# Patient Record
Sex: FEMALE | Race: BLACK OR AFRICAN AMERICAN | NOT HISPANIC OR LATINO | URBAN - METROPOLITAN AREA
[De-identification: names, ages, dates, MRNs, and addresses within clinical notes are randomized per-mention and may not be internally consistent; named-entity substitution may affect disease eponyms.]

---

## 2017-10-26 ENCOUNTER — EMERGENCY (EMERGENCY)
Facility: HOSPITAL | Age: 22
LOS: 1 days | Discharge: HOME | End: 2017-10-26
Attending: EMERGENCY MEDICINE | Admitting: EMERGENCY MEDICINE
Payer: MEDICAID

## 2017-10-26 VITALS
DIASTOLIC BLOOD PRESSURE: 73 MMHG | RESPIRATION RATE: 15 BRPM | HEART RATE: 105 BPM | SYSTOLIC BLOOD PRESSURE: 106 MMHG | OXYGEN SATURATION: 100 % | TEMPERATURE: 98 F

## 2017-10-26 LAB
BASOPHILS # BLD AUTO: 0 K/UL — SIGNIFICANT CHANGE UP (ref 0–0.2)
BASOPHILS NFR BLD AUTO: 0 % — SIGNIFICANT CHANGE UP (ref 0–2)
CHLORIDE SERPL-SCNC: 103 MMOL/L — SIGNIFICANT CHANGE UP (ref 98–107)
EOSINOPHIL # BLD AUTO: 0.02 K/UL — SIGNIFICANT CHANGE UP (ref 0–0.5)
EOSINOPHIL NFR BLD AUTO: 0.2 % — SIGNIFICANT CHANGE UP (ref 0–6)
HCG SERPL-ACNC: < 5 MIU/ML — SIGNIFICANT CHANGE UP
HCT VFR BLD CALC: 31 % — LOW (ref 34.5–45)
HGB BLD-MCNC: 9.5 G/DL — LOW (ref 11.5–15.5)
IMM GRANULOCYTES # BLD AUTO: 0.04 # — SIGNIFICANT CHANGE UP
IMM GRANULOCYTES NFR BLD AUTO: 0.4 % — SIGNIFICANT CHANGE UP (ref 0–1.5)
INR BLD: 1.09 — SIGNIFICANT CHANGE UP (ref 0.88–1.17)
LYMPHOCYTES # BLD AUTO: 1.01 K/UL — SIGNIFICANT CHANGE UP (ref 1–3.3)
LYMPHOCYTES # BLD AUTO: 10 % — LOW (ref 13–44)
MCHC RBC-ENTMCNC: 24.6 PG — LOW (ref 27–34)
MCHC RBC-ENTMCNC: 30.6 % — LOW (ref 32–36)
MCV RBC AUTO: 80.3 FL — SIGNIFICANT CHANGE UP (ref 80–100)
MONOCYTES # BLD AUTO: 0.41 K/UL — SIGNIFICANT CHANGE UP (ref 0–0.9)
MONOCYTES NFR BLD AUTO: 4.1 % — SIGNIFICANT CHANGE UP (ref 2–14)
NEUTROPHILS # BLD AUTO: 8.6 K/UL — HIGH (ref 1.8–7.4)
NEUTROPHILS NFR BLD AUTO: 85.3 % — HIGH (ref 43–77)
NRBC # FLD: 0 — SIGNIFICANT CHANGE UP
PLATELET # BLD AUTO: 610 K/UL — HIGH (ref 150–400)
PMV BLD: 10.8 FL — SIGNIFICANT CHANGE UP (ref 7–13)
POTASSIUM SERPL-MCNC: 4 MMOL/L — SIGNIFICANT CHANGE UP (ref 3.5–5.3)
POTASSIUM SERPL-SCNC: 4 MMOL/L — SIGNIFICANT CHANGE UP (ref 3.5–5.3)
PROTHROM AB SERPL-ACNC: 12.2 SEC — SIGNIFICANT CHANGE UP (ref 9.8–13.1)
RBC # BLD: 3.86 M/UL — SIGNIFICANT CHANGE UP (ref 3.8–5.2)
RBC # FLD: 15.5 % — HIGH (ref 10.3–14.5)
SODIUM SERPL-SCNC: 139 MMOL/L — SIGNIFICANT CHANGE UP (ref 135–145)
WBC # BLD: 10.08 K/UL — SIGNIFICANT CHANGE UP (ref 3.8–10.5)
WBC # FLD AUTO: 10.08 K/UL — SIGNIFICANT CHANGE UP (ref 3.8–10.5)

## 2017-10-26 PROCEDURE — 93010 ELECTROCARDIOGRAM REPORT: CPT

## 2017-10-26 PROCEDURE — 99285 EMERGENCY DEPT VISIT HI MDM: CPT | Mod: 25

## 2017-10-26 RX ORDER — IBUPROFEN 200 MG
400 TABLET ORAL ONCE
Qty: 0 | Refills: 0 | Status: COMPLETED | OUTPATIENT
Start: 2017-10-26 | End: 2017-10-26

## 2017-10-26 RX ORDER — ALBUTEROL 90 UG/1
2.5 AEROSOL, METERED ORAL ONCE
Qty: 0 | Refills: 0 | Status: COMPLETED | OUTPATIENT
Start: 2017-10-26 | End: 2017-10-26

## 2017-10-26 NOTE — ED ADULT NURSE NOTE - OBJECTIVE STATEMENT
A&Ox4, respirations even and unlabored, skin warm and dry good for color, ambulates with steady gait. Patient reports non-radiating midsternal chest pain and SOB since Saturday. Went to [facility] and was discharged today, was told had a lupus flare up. States came to [facility] ED due to no improvement of pain. Hx lupus.

## 2017-10-26 NOTE — ED ADULT TRIAGE NOTE - CHIEF COMPLAINT QUOTE
pt c/o sharp midsternal chest pain since saturday. pain accompanied by sob but does not radiate and denies n/v, diaphoresis or any numbness, tingling in extremities. hx lupus. pt appears comfortable

## 2017-10-27 VITALS
DIASTOLIC BLOOD PRESSURE: 56 MMHG | HEART RATE: 111 BPM | TEMPERATURE: 99 F | SYSTOLIC BLOOD PRESSURE: 104 MMHG | OXYGEN SATURATION: 98 %

## 2017-10-27 LAB
ALBUMIN SERPL ELPH-MCNC: 4 G/DL — SIGNIFICANT CHANGE UP (ref 3.3–5)
ALP SERPL-CCNC: 44 U/L — SIGNIFICANT CHANGE UP (ref 40–120)
ALT FLD-CCNC: 6 U/L — SIGNIFICANT CHANGE UP (ref 4–33)
APTT BLD: 22.5 SEC — LOW (ref 27.5–37.4)
AST SERPL-CCNC: 15 U/L — SIGNIFICANT CHANGE UP (ref 4–32)
BILIRUB SERPL-MCNC: 0.2 MG/DL — SIGNIFICANT CHANGE UP (ref 0.2–1.2)
BUN SERPL-MCNC: 12 MG/DL — SIGNIFICANT CHANGE UP (ref 7–23)
CALCIUM SERPL-MCNC: 8.7 MG/DL — SIGNIFICANT CHANGE UP (ref 8.4–10.5)
CK MB BLD-MCNC: 1 NG/ML — SIGNIFICANT CHANGE UP (ref 1–4.7)
CK MB BLD-MCNC: SIGNIFICANT CHANGE UP (ref 0–2.5)
CK SERPL-CCNC: 32 U/L — SIGNIFICANT CHANGE UP (ref 25–170)
CO2 SERPL-SCNC: 21 MMOL/L — LOW (ref 22–31)
CREAT SERPL-MCNC: 0.66 MG/DL — SIGNIFICANT CHANGE UP (ref 0.5–1.3)
GLUCOSE SERPL-MCNC: 120 MG/DL — HIGH (ref 70–99)
NT-PROBNP SERPL-SCNC: 100.6 PG/ML — SIGNIFICANT CHANGE UP
PROT SERPL-MCNC: 8.7 G/DL — HIGH (ref 6–8.3)
TROPONIN T SERPL-MCNC: < 0.06 NG/ML — SIGNIFICANT CHANGE UP (ref 0–0.06)

## 2017-10-27 PROCEDURE — 71020: CPT | Mod: 26

## 2017-10-27 RX ADMIN — Medication 24 MILLIGRAM(S): at 01:13

## 2017-10-27 RX ADMIN — ALBUTEROL 2.5 MILLIGRAM(S): 90 AEROSOL, METERED ORAL at 00:53

## 2017-10-27 RX ADMIN — Medication 400 MILLIGRAM(S): at 00:53

## 2017-10-27 NOTE — ED PROVIDER NOTE - MEDICAL DECISION MAKING DETAILS
21F SLE p/w chest pain, dyspnea. Negative CTA for PE 3d ago. Possible recurrence of pericardial effusion. Symptomatic tx with albuterol, ibuprofen. Labs, CXR. EKG abnormal with multiple TWI.

## 2017-10-27 NOTE — ED PROVIDER NOTE - PROGRESS NOTE DETAILS
Bilateral pleural effusions on CXR. Pt ambulating without dyspnea. C/o palpitations after albuterol. Appears well. Called answering service for Dr. Willett at [facility], did not respond to multiple phone calls. Will discharge with recommendation to continue steroids.

## 2017-10-27 NOTE — ED PROVIDER NOTE - OBJECTIVE STATEMENT
20yo female pmh SLE on cellcept/plaquenil p/w chest pain, dyspnea worse with exertion x 5d. CP sharp, pleuritic in nature, non-radiating, substernal. No dyspnea at rest. Denies f/c/n/v/d, abd pain, LOC, vision changes. No history of DVT/PE. Hospitalized 10/23-26, given methylprednisolone IV and medrol x 2w, pt did not take medrol today as she did not fill Rx. CTA showed no PE, but revealed complex mild-moderate pericardial effusion that resolved next day on TTE (10/24). Follows with [facility] rheumatology.

## 2017-10-27 NOTE — ED PROVIDER NOTE - ATTENDING CONTRIBUTION TO CARE
I performed a face-to-face evaluation of the patient and performed a history and physical examination. I agree with the history and physical examination.    21 F, lupus, CP and SOB 4 days, worse with movement, discharged today from [facility], CT PE neg, echo no pericardial effusion. Unknown lupus anticoagulant Ab status.  Appears well. NAD. . No LE edema. Walked briskly without problem. Occasional small expiratory wheeze. EKG w/ diffuse TWI. Will give nebs, check trop and BNP. Contact her Rheum.

## 2017-10-27 NOTE — ED PROVIDER NOTE - CARE PLAN
Principal Discharge DX:	Chest pain  Secondary Diagnosis:	Lupus erythematosus, unspecified form Principal Discharge DX:	Pleural effusion  Secondary Diagnosis:	Lupus erythematosus, unspecified form

## 2018-10-22 ENCOUNTER — INPATIENT (INPATIENT)
Facility: HOSPITAL | Age: 23
LOS: 1 days | Discharge: HOME | End: 2018-10-24
Attending: HOSPITALIST | Admitting: HOSPITALIST
Payer: COMMERCIAL

## 2018-10-22 VITALS
SYSTOLIC BLOOD PRESSURE: 98 MMHG | RESPIRATION RATE: 20 BRPM | DIASTOLIC BLOOD PRESSURE: 62 MMHG | TEMPERATURE: 100 F | HEART RATE: 112 BPM | OXYGEN SATURATION: 98 %

## 2018-10-22 DIAGNOSIS — L93.0 DISCOID LUPUS ERYTHEMATOSUS: ICD-10-CM

## 2018-10-22 LAB
ALBUMIN SERPL ELPH-MCNC: 3.2 G/DL — LOW (ref 3.3–5)
ALP SERPL-CCNC: 40 U/L — SIGNIFICANT CHANGE UP (ref 40–120)
ALT FLD-CCNC: 4 U/L — SIGNIFICANT CHANGE UP (ref 4–33)
APPEARANCE UR: SIGNIFICANT CHANGE UP
AST SERPL-CCNC: 17 U/L — SIGNIFICANT CHANGE UP (ref 4–32)
B PERT DNA SPEC QL NAA+PROBE: SIGNIFICANT CHANGE UP
BACTERIA # UR AUTO: SIGNIFICANT CHANGE UP
BASE EXCESS BLDV CALC-SCNC: 1.5 MMOL/L — SIGNIFICANT CHANGE UP
BASOPHILS # BLD AUTO: 0.01 K/UL — SIGNIFICANT CHANGE UP (ref 0–0.2)
BASOPHILS NFR BLD AUTO: 0.1 % — SIGNIFICANT CHANGE UP (ref 0–2)
BILIRUB SERPL-MCNC: < 0.2 MG/DL — LOW (ref 0.2–1.2)
BILIRUB UR-MCNC: NEGATIVE — SIGNIFICANT CHANGE UP
BLOOD GAS VENOUS - CREATININE: SIGNIFICANT CHANGE UP MG/DL (ref 0.5–1.3)
BLOOD UR QL VISUAL: NEGATIVE — SIGNIFICANT CHANGE UP
BUN SERPL-MCNC: 15 MG/DL — SIGNIFICANT CHANGE UP (ref 7–23)
C PNEUM DNA SPEC QL NAA+PROBE: NOT DETECTED — SIGNIFICANT CHANGE UP
CALCIUM SERPL-MCNC: 8.4 MG/DL — SIGNIFICANT CHANGE UP (ref 8.4–10.5)
CHLORIDE BLDV-SCNC: 104 MMOL/L — SIGNIFICANT CHANGE UP (ref 96–108)
CHLORIDE SERPL-SCNC: 100 MMOL/L — SIGNIFICANT CHANGE UP (ref 98–107)
CO2 SERPL-SCNC: 24 MMOL/L — SIGNIFICANT CHANGE UP (ref 22–31)
COLOR SPEC: YELLOW — SIGNIFICANT CHANGE UP
CREAT SERPL-MCNC: 0.72 MG/DL — SIGNIFICANT CHANGE UP (ref 0.5–1.3)
CRP SERPL-MCNC: 51.7 MG/L — HIGH
EOSINOPHIL # BLD AUTO: 0 K/UL — SIGNIFICANT CHANGE UP (ref 0–0.5)
EOSINOPHIL NFR BLD AUTO: 0 % — SIGNIFICANT CHANGE UP (ref 0–6)
ERYTHROCYTE [SEDIMENTATION RATE] IN BLOOD: 33 MM/HR — HIGH (ref 4–25)
FLUAV H1 2009 PAND RNA SPEC QL NAA+PROBE: NOT DETECTED — SIGNIFICANT CHANGE UP
FLUAV H1 RNA SPEC QL NAA+PROBE: NOT DETECTED — SIGNIFICANT CHANGE UP
FLUAV H3 RNA SPEC QL NAA+PROBE: NOT DETECTED — SIGNIFICANT CHANGE UP
FLUAV SUBTYP SPEC NAA+PROBE: SIGNIFICANT CHANGE UP
FLUBV RNA SPEC QL NAA+PROBE: NOT DETECTED — SIGNIFICANT CHANGE UP
GAS PNL BLDV: 135 MMOL/L — LOW (ref 136–146)
GLUCOSE BLDV-MCNC: 108 — HIGH (ref 70–99)
GLUCOSE SERPL-MCNC: 106 MG/DL — HIGH (ref 70–99)
GLUCOSE UR-MCNC: NEGATIVE — SIGNIFICANT CHANGE UP
HADV DNA SPEC QL NAA+PROBE: NOT DETECTED — SIGNIFICANT CHANGE UP
HCO3 BLDV-SCNC: 25 MMOL/L — SIGNIFICANT CHANGE UP (ref 20–27)
HCOV 229E RNA SPEC QL NAA+PROBE: NOT DETECTED — SIGNIFICANT CHANGE UP
HCOV HKU1 RNA SPEC QL NAA+PROBE: NOT DETECTED — SIGNIFICANT CHANGE UP
HCOV NL63 RNA SPEC QL NAA+PROBE: NOT DETECTED — SIGNIFICANT CHANGE UP
HCOV OC43 RNA SPEC QL NAA+PROBE: NOT DETECTED — SIGNIFICANT CHANGE UP
HCT VFR BLD CALC: 30.1 % — LOW (ref 34.5–45)
HCT VFR BLDV CALC: 28.2 % — LOW (ref 34.5–45)
HGB BLD-MCNC: 9.1 G/DL — LOW (ref 11.5–15.5)
HGB BLDV-MCNC: 9.1 G/DL — LOW (ref 11.5–15.5)
HMPV RNA SPEC QL NAA+PROBE: NOT DETECTED — SIGNIFICANT CHANGE UP
HPIV1 RNA SPEC QL NAA+PROBE: NOT DETECTED — SIGNIFICANT CHANGE UP
HPIV2 RNA SPEC QL NAA+PROBE: NOT DETECTED — SIGNIFICANT CHANGE UP
HPIV3 RNA SPEC QL NAA+PROBE: NOT DETECTED — SIGNIFICANT CHANGE UP
HPIV4 RNA SPEC QL NAA+PROBE: NOT DETECTED — SIGNIFICANT CHANGE UP
HYALINE CASTS # UR AUTO: HIGH
IMM GRANULOCYTES # BLD AUTO: 0.05 # — SIGNIFICANT CHANGE UP
IMM GRANULOCYTES NFR BLD AUTO: 0.7 % — SIGNIFICANT CHANGE UP (ref 0–1.5)
KETONES UR-MCNC: NEGATIVE — SIGNIFICANT CHANGE UP
LACTATE BLDV-MCNC: 2.4 MMOL/L — HIGH (ref 0.5–2)
LEUKOCYTE ESTERASE UR-ACNC: NEGATIVE — SIGNIFICANT CHANGE UP
LYMPHOCYTES # BLD AUTO: 0.29 K/UL — LOW (ref 1–3.3)
LYMPHOCYTES # BLD AUTO: 4.1 % — LOW (ref 13–44)
M PNEUMO DNA SPEC QL NAA+PROBE: NOT DETECTED — SIGNIFICANT CHANGE UP
MCHC RBC-ENTMCNC: 22.6 PG — LOW (ref 27–34)
MCHC RBC-ENTMCNC: 30.2 % — LOW (ref 32–36)
MCV RBC AUTO: 74.7 FL — LOW (ref 80–100)
MONOCYTES # BLD AUTO: 0.19 K/UL — SIGNIFICANT CHANGE UP (ref 0–0.9)
MONOCYTES NFR BLD AUTO: 2.7 % — SIGNIFICANT CHANGE UP (ref 2–14)
NEUTROPHILS # BLD AUTO: 6.54 K/UL — SIGNIFICANT CHANGE UP (ref 1.8–7.4)
NEUTROPHILS NFR BLD AUTO: 92.4 % — HIGH (ref 43–77)
NITRITE UR-MCNC: NEGATIVE — SIGNIFICANT CHANGE UP
NRBC # FLD: 0 — SIGNIFICANT CHANGE UP
PCO2 BLDV: 41 MMHG — SIGNIFICANT CHANGE UP (ref 41–51)
PH BLDV: 7.41 PH — SIGNIFICANT CHANGE UP (ref 7.32–7.43)
PH UR: 6.5 — SIGNIFICANT CHANGE UP (ref 5–8)
PLATELET # BLD AUTO: 551 K/UL — HIGH (ref 150–400)
PMV BLD: 10.4 FL — SIGNIFICANT CHANGE UP (ref 7–13)
PO2 BLDV: 36 MMHG — SIGNIFICANT CHANGE UP (ref 35–40)
POTASSIUM BLDV-SCNC: 3.7 MMOL/L — SIGNIFICANT CHANGE UP (ref 3.4–4.5)
POTASSIUM SERPL-MCNC: 3.8 MMOL/L — SIGNIFICANT CHANGE UP (ref 3.5–5.3)
POTASSIUM SERPL-SCNC: 3.8 MMOL/L — SIGNIFICANT CHANGE UP (ref 3.5–5.3)
PROT SERPL-MCNC: 7.1 G/DL — SIGNIFICANT CHANGE UP (ref 6–8.3)
PROT UR-MCNC: 100 — HIGH
RBC # BLD: 4.03 M/UL — SIGNIFICANT CHANGE UP (ref 3.8–5.2)
RBC # FLD: 17.2 % — HIGH (ref 10.3–14.5)
RBC CASTS # UR COMP ASSIST: SIGNIFICANT CHANGE UP (ref 0–?)
RSV RNA SPEC QL NAA+PROBE: NOT DETECTED — SIGNIFICANT CHANGE UP
RV+EV RNA SPEC QL NAA+PROBE: NOT DETECTED — SIGNIFICANT CHANGE UP
SAO2 % BLDV: 67.1 % — SIGNIFICANT CHANGE UP (ref 60–85)
SODIUM SERPL-SCNC: 136 MMOL/L — SIGNIFICANT CHANGE UP (ref 135–145)
SP GR SPEC: 1.03 — SIGNIFICANT CHANGE UP (ref 1–1.04)
SQUAMOUS # UR AUTO: SIGNIFICANT CHANGE UP
UROBILINOGEN FLD QL: SIGNIFICANT CHANGE UP
WBC # BLD: 7.08 K/UL — SIGNIFICANT CHANGE UP (ref 3.8–10.5)
WBC # FLD AUTO: 7.08 K/UL — SIGNIFICANT CHANGE UP (ref 3.8–10.5)
WBC UR QL: HIGH (ref 0–?)

## 2018-10-22 PROCEDURE — 71046 X-RAY EXAM CHEST 2 VIEWS: CPT | Mod: 26

## 2018-10-22 PROCEDURE — 74177 CT ABD & PELVIS W/CONTRAST: CPT | Mod: 26

## 2018-10-22 RX ORDER — SODIUM CHLORIDE 9 MG/ML
1000 INJECTION INTRAMUSCULAR; INTRAVENOUS; SUBCUTANEOUS ONCE
Qty: 0 | Refills: 0 | Status: COMPLETED | OUTPATIENT
Start: 2018-10-22 | End: 2018-10-22

## 2018-10-22 RX ORDER — KETOROLAC TROMETHAMINE 30 MG/ML
15 SYRINGE (ML) INJECTION ONCE
Qty: 0 | Refills: 0 | Status: DISCONTINUED | OUTPATIENT
Start: 2018-10-22 | End: 2018-10-22

## 2018-10-22 RX ORDER — DIPHENHYDRAMINE HCL 50 MG
25 CAPSULE ORAL ONCE
Qty: 0 | Refills: 0 | Status: COMPLETED | OUTPATIENT
Start: 2018-10-22 | End: 2018-10-22

## 2018-10-22 RX ORDER — FENTANYL CITRATE 50 UG/ML
25 INJECTION INTRAVENOUS ONCE
Qty: 0 | Refills: 0 | Status: DISCONTINUED | OUTPATIENT
Start: 2018-10-22 | End: 2018-10-22

## 2018-10-22 RX ORDER — DIPHENHYDRAMINE HCL 50 MG
50 CAPSULE ORAL ONCE
Qty: 0 | Refills: 0 | Status: DISCONTINUED | OUTPATIENT
Start: 2018-10-22 | End: 2018-10-22

## 2018-10-22 RX ORDER — CEFTRIAXONE 500 MG/1
1 INJECTION, POWDER, FOR SOLUTION INTRAMUSCULAR; INTRAVENOUS ONCE
Qty: 0 | Refills: 0 | Status: COMPLETED | OUTPATIENT
Start: 2018-10-22 | End: 2018-10-22

## 2018-10-22 RX ORDER — ACETAMINOPHEN 500 MG
975 TABLET ORAL ONCE
Qty: 0 | Refills: 0 | Status: COMPLETED | OUTPATIENT
Start: 2018-10-22 | End: 2018-10-22

## 2018-10-22 RX ORDER — MORPHINE SULFATE 50 MG/1
4 CAPSULE, EXTENDED RELEASE ORAL ONCE
Qty: 0 | Refills: 0 | Status: DISCONTINUED | OUTPATIENT
Start: 2018-10-22 | End: 2018-10-23

## 2018-10-22 RX ADMIN — Medication 975 MILLIGRAM(S): at 19:59

## 2018-10-22 RX ADMIN — SODIUM CHLORIDE 1000 MILLILITER(S): 9 INJECTION INTRAMUSCULAR; INTRAVENOUS; SUBCUTANEOUS at 19:03

## 2018-10-22 RX ADMIN — Medication 15 MILLIGRAM(S): at 19:59

## 2018-10-22 RX ADMIN — FENTANYL CITRATE 25 MICROGRAM(S): 50 INJECTION INTRAVENOUS at 19:59

## 2018-10-22 RX ADMIN — Medication 25 MILLIGRAM(S): at 22:58

## 2018-10-22 RX ADMIN — SODIUM CHLORIDE 1000 MILLILITER(S): 9 INJECTION INTRAMUSCULAR; INTRAVENOUS; SUBCUTANEOUS at 19:59

## 2018-10-22 RX ADMIN — Medication 975 MILLIGRAM(S): at 19:03

## 2018-10-22 NOTE — ED PROVIDER NOTE - PROGRESS NOTE DETAILS
JULIANN Castellanos: Pt received as signout, pending CT abd/pelvis for diffuse intermittent abd pain x 1wk, diffuse tenderness. Pt still w/10/10 pain despite tylenol. will administer more analgesia pending hcg result. RN informed. Keaton Kenney is complaining of throat itching after eating some chocolate chipped cookies. She denies any known allergies, difficulty breathing, cp, rashes, swelling. Ordered 25 of benadryl

## 2018-10-22 NOTE — ED PROVIDER NOTE - MEDICAL DECISION MAKING DETAILS
23 y/o F hx lupus on biologic injections monthly here w/ fever x 1week w/ anorexia. Exam non focal besides for mild LLQ abdominal tenderness, however no guarding. Plan labs/blood cultures, pain medications, cxr/ua/rvp to look for source. DDX infectious vs. lupus flair 23 y/o F hx lupus on biologic injections monthly here w/ fever x 1week w/ anorexia. Exam non focal besides for mild LLQ abdominal tenderness, however no guarding will do ct abdomen. Plan labs/blood cultures, pain medications, cxr/ua/rvp to look for source. DDX infectious vs. lupus flair

## 2018-10-22 NOTE — ED PROVIDER NOTE - OBJECTIVE STATEMENT
23 y/o F hx lupus on Prednisone and biologic infusions monthly here c/o joint pain x 1 week. Admits to a tactile fever, anorexia and nausea over the past week as well. States joint pain is consistent w/ her typical lupus flairs. Did not come to the ER because she thought she would improve w/ Tylenol at home. Denies abdominal pain, dysuria, flank pain, cough, sob, rash or any other complaints. Of note, gets pleural effusions due to lupus. LMP 1 week ago. No sick contacts

## 2018-10-22 NOTE — ED PROVIDER NOTE - ATTENDING CONTRIBUTION TO CARE
I performed the initial face to face bedside interview with this patient regarding history of present illness, review of symptoms and past medical, social and family history.  I completed an independent physical examination.  I was the initial provider who evaluated this patient.  The history, review of symptoms and examination was documented by the scribe in my presence and I attest to the accuracy of the documentation.  I have signed out the follow up of any pending tests (i.e. labs, radiological studies) to the PA.  I have discussed the patient’s plan of care and disposition with the PA. I performed the initial face to face bedside interview with this patient regarding history of present illness, review of symptoms and past medical, social and family history.  I completed an independent physical examination.  I was the initial provider who evaluated this patient.  The history, review of symptoms and examination was documented by the scribe in my presence and I attest to the accuracy of the documentation.  I have signed out the follow up of any pending tests (i.e. labs, radiological studies) to the PA.  I have discussed the patient’s plan of care and disposition with the PA.    22F h/o SLE on immunotherapy presents with 1 week of fevers and joint pains.    Exam:  - nad  - rrr   -ctab  - abd soft, diffusely uncomfortable to palpation    A/P  - likely SLE flare, r/o intra-abd pathology  - basic labs, ua, ucg, CT abd/pelvis  - pain control, reassess I performed the initial face to face bedside interview with this patient regarding history of present illness, review of symptoms and past medical, social and family history.  I completed an independent physical examination.  I was the initial provider who evaluated this patient.  The history, review of symptoms and examination was documented by the scribe in my presence and I attest to the accuracy of the documentation.  I have signed out the follow up of any pending tests (i.e. labs, radiological studies) to the PA and resident..  I have discussed the patient’s plan of care and disposition with the PA and resident.    22F h/o SLE on immunotherapy presents with 1 week of fevers and joint pains.    Exam:  - nad  - rrr   -ctab  - abd soft, diffusely uncomfortable to palpation    A/P  - likely SLE flare, r/o intra-abd pathology  - basic labs, ua, ucg, CT abd/pelvis  - pain control, reassess

## 2018-10-22 NOTE — ED ADULT NURSE NOTE - OBJECTIVE STATEMENT
Received pt. in room 8 alert and oriented x 4, presenting to the ER complaining of bilateral leg pain, back pain and pain in her joints. Pt. has a history of Lupus and stated " I have been having joint pain, back pain and feeling tired with fever since last week". Patient has no c/o chest pain no shortness of breath, labs sent, medicate as ordered, will continue to monitor.

## 2018-10-22 NOTE — ED ADULT NURSE NOTE - NSIMPLEMENTINTERV_GEN_ALL_ED
Implemented All Universal Safety Interventions:  [place] to call system. Call bell, personal items and telephone within reach. Instruct patient to call for assistance. Room bathroom lighting operational. Non-slip footwear when patient is off stretcher. Physically safe environment: no spills, clutter or unnecessary equipment. Stretcher in lowest position, wheels locked, appropriate side rails in place.

## 2018-10-23 DIAGNOSIS — L93.0 DISCOID LUPUS ERYTHEMATOSUS: ICD-10-CM

## 2018-10-23 DIAGNOSIS — D50.9 IRON DEFICIENCY ANEMIA, UNSPECIFIED: ICD-10-CM

## 2018-10-23 DIAGNOSIS — R80.9 PROTEINURIA, UNSPECIFIED: ICD-10-CM

## 2018-10-23 DIAGNOSIS — Z29.9 ENCOUNTER FOR PROPHYLACTIC MEASURES, UNSPECIFIED: ICD-10-CM

## 2018-10-23 LAB
ALBUMIN SERPL ELPH-MCNC: 2.5 G/DL — LOW (ref 3.3–5)
ALP SERPL-CCNC: 39 U/L — LOW (ref 40–120)
ALT FLD-CCNC: 4 U/L — SIGNIFICANT CHANGE UP (ref 4–33)
ANISOCYTOSIS BLD QL: SIGNIFICANT CHANGE UP
AST SERPL-CCNC: 13 U/L — SIGNIFICANT CHANGE UP (ref 4–32)
BASOPHILS # BLD AUTO: 0 K/UL — SIGNIFICANT CHANGE UP (ref 0–0.2)
BASOPHILS NFR BLD AUTO: 0 % — SIGNIFICANT CHANGE UP (ref 0–2)
BASOPHILS NFR SPEC: 1.8 % — SIGNIFICANT CHANGE UP (ref 0–2)
BILIRUB SERPL-MCNC: < 0.2 MG/DL — LOW (ref 0.2–1.2)
BLASTS # FLD: 0 % — SIGNIFICANT CHANGE UP (ref 0–0)
BUN SERPL-MCNC: 8 MG/DL — SIGNIFICANT CHANGE UP (ref 7–23)
C3 SERPL-MCNC: 43.1 MG/DL — LOW (ref 90–180)
C4 SERPL-MCNC: 9.1 MG/DL — LOW (ref 10–40)
CALCIUM SERPL-MCNC: 7.9 MG/DL — LOW (ref 8.4–10.5)
CHLORIDE SERPL-SCNC: 106 MMOL/L — SIGNIFICANT CHANGE UP (ref 98–107)
CO2 SERPL-SCNC: 23 MMOL/L — SIGNIFICANT CHANGE UP (ref 22–31)
CREAT SERPL-MCNC: 0.58 MG/DL — SIGNIFICANT CHANGE UP (ref 0.5–1.3)
DACRYOCYTES BLD QL SMEAR: SLIGHT — SIGNIFICANT CHANGE UP
EOSINOPHIL # BLD AUTO: 0.01 K/UL — SIGNIFICANT CHANGE UP (ref 0–0.5)
EOSINOPHIL NFR BLD AUTO: 0.3 % — SIGNIFICANT CHANGE UP (ref 0–6)
EOSINOPHIL NFR FLD: 0 % — SIGNIFICANT CHANGE UP (ref 0–6)
FERRITIN SERPL-MCNC: 95.69 NG/ML — SIGNIFICANT CHANGE UP (ref 15–150)
GIANT PLATELETS BLD QL SMEAR: PRESENT — SIGNIFICANT CHANGE UP
GLUCOSE SERPL-MCNC: 83 MG/DL — SIGNIFICANT CHANGE UP (ref 70–99)
HCT VFR BLD CALC: 31.2 % — LOW (ref 34.5–45)
HGB BLD-MCNC: 9.2 G/DL — LOW (ref 11.5–15.5)
HYPOCHROMIA BLD QL: SIGNIFICANT CHANGE UP
IMM GRANULOCYTES # BLD AUTO: 0.02 # — SIGNIFICANT CHANGE UP
IMM GRANULOCYTES NFR BLD AUTO: 0.7 % — SIGNIFICANT CHANGE UP (ref 0–1.5)
IRON SATN MFR SERPL: 16 UG/DL — LOW (ref 30–160)
IRON SATN MFR SERPL: 197 UG/DL — SIGNIFICANT CHANGE UP (ref 140–530)
LYMPHOCYTES # BLD AUTO: 0.64 K/UL — LOW (ref 1–3.3)
LYMPHOCYTES # BLD AUTO: 20.9 % — SIGNIFICANT CHANGE UP (ref 13–44)
LYMPHOCYTES NFR SPEC AUTO: 15.5 % — SIGNIFICANT CHANGE UP (ref 13–44)
MCHC RBC-ENTMCNC: 22.2 PG — LOW (ref 27–34)
MCHC RBC-ENTMCNC: 29.5 % — LOW (ref 32–36)
MCV RBC AUTO: 75.2 FL — LOW (ref 80–100)
METAMYELOCYTES # FLD: 0 % — SIGNIFICANT CHANGE UP (ref 0–1)
MICROCYTES BLD QL: SIGNIFICANT CHANGE UP
MONOCYTES # BLD AUTO: 0.19 K/UL — SIGNIFICANT CHANGE UP (ref 0–0.9)
MONOCYTES NFR BLD AUTO: 6.2 % — SIGNIFICANT CHANGE UP (ref 2–14)
MONOCYTES NFR BLD: 1.8 % — LOW (ref 2–9)
MYELOCYTES NFR BLD: 0 % — SIGNIFICANT CHANGE UP (ref 0–0)
NEUTROPHIL AB SER-ACNC: 77.3 % — HIGH (ref 43–77)
NEUTROPHILS # BLD AUTO: 2.2 K/UL — SIGNIFICANT CHANGE UP (ref 1.8–7.4)
NEUTROPHILS NFR BLD AUTO: 71.9 % — SIGNIFICANT CHANGE UP (ref 43–77)
NEUTS BAND # BLD: 0 % — SIGNIFICANT CHANGE UP (ref 0–6)
NRBC # FLD: 0 — SIGNIFICANT CHANGE UP
OTHER - HEMATOLOGY %: 0 — SIGNIFICANT CHANGE UP
OVALOCYTES BLD QL SMEAR: SLIGHT — SIGNIFICANT CHANGE UP
PLATELET # BLD AUTO: 470 K/UL — HIGH (ref 150–400)
PLATELET COUNT - ESTIMATE: NORMAL — SIGNIFICANT CHANGE UP
PMV BLD: 9.9 FL — SIGNIFICANT CHANGE UP (ref 7–13)
POIKILOCYTOSIS BLD QL AUTO: SIGNIFICANT CHANGE UP
POLYCHROMASIA BLD QL SMEAR: SLIGHT — SIGNIFICANT CHANGE UP
POTASSIUM SERPL-MCNC: 3.9 MMOL/L — SIGNIFICANT CHANGE UP (ref 3.5–5.3)
POTASSIUM SERPL-SCNC: 3.9 MMOL/L — SIGNIFICANT CHANGE UP (ref 3.5–5.3)
PROMYELOCYTES # FLD: 0 % — SIGNIFICANT CHANGE UP (ref 0–0)
PROT SERPL-MCNC: 6.1 G/DL — SIGNIFICANT CHANGE UP (ref 6–8.3)
RBC # BLD: 4.15 M/UL — SIGNIFICANT CHANGE UP (ref 3.8–5.2)
RBC # FLD: 17.7 % — HIGH (ref 10.3–14.5)
REVIEW TO FOLLOW: YES — SIGNIFICANT CHANGE UP
SCHISTOCYTES BLD QL AUTO: SLIGHT — SIGNIFICANT CHANGE UP
SMUDGE CELLS # BLD: PRESENT — SIGNIFICANT CHANGE UP
SODIUM SERPL-SCNC: 138 MMOL/L — SIGNIFICANT CHANGE UP (ref 135–145)
SPECIMEN SOURCE: SIGNIFICANT CHANGE UP
SPECIMEN SOURCE: SIGNIFICANT CHANGE UP
TRANSFERRIN SERPL-MCNC: 153 MG/DL — LOW (ref 200–360)
UIBC SERPL-MCNC: 180.9 UG/DL — SIGNIFICANT CHANGE UP (ref 110–370)
VARIANT LYMPHS # BLD: 3.6 % — SIGNIFICANT CHANGE UP
WBC # BLD: 3.06 K/UL — LOW (ref 3.8–10.5)
WBC # FLD AUTO: 3.06 K/UL — LOW (ref 3.8–10.5)

## 2018-10-23 PROCEDURE — 99255 IP/OBS CONSLTJ NEW/EST HI 80: CPT

## 2018-10-23 PROCEDURE — 99223 1ST HOSP IP/OBS HIGH 75: CPT | Mod: GC

## 2018-10-23 PROCEDURE — 12345: CPT | Mod: NC,GC

## 2018-10-23 RX ORDER — PANTOPRAZOLE SODIUM 20 MG/1
1 TABLET, DELAYED RELEASE ORAL
Qty: 0 | Refills: 0 | COMMUNITY
Start: 2018-10-23

## 2018-10-23 RX ORDER — ENOXAPARIN SODIUM 100 MG/ML
40 INJECTION SUBCUTANEOUS EVERY 24 HOURS
Qty: 0 | Refills: 0 | Status: DISCONTINUED | OUTPATIENT
Start: 2018-10-23 | End: 2018-10-23

## 2018-10-23 RX ORDER — PANTOPRAZOLE SODIUM 20 MG/1
40 TABLET, DELAYED RELEASE ORAL
Qty: 0 | Refills: 0 | Status: DISCONTINUED | OUTPATIENT
Start: 2018-10-23 | End: 2018-10-24

## 2018-10-23 RX ORDER — HYDROXYCHLOROQUINE SULFATE 200 MG
1 TABLET ORAL
Qty: 0 | Refills: 0 | COMMUNITY
Start: 2018-10-23

## 2018-10-23 RX ORDER — KETOROLAC TROMETHAMINE 30 MG/ML
10 SYRINGE (ML) INJECTION EVERY 4 HOURS
Qty: 0 | Refills: 0 | Status: DISCONTINUED | OUTPATIENT
Start: 2018-10-23 | End: 2018-10-24

## 2018-10-23 RX ORDER — INFLUENZA VIRUS VACCINE 15; 15; 15; 15 UG/.5ML; UG/.5ML; UG/.5ML; UG/.5ML
0.5 SUSPENSION INTRAMUSCULAR ONCE
Qty: 0 | Refills: 0 | Status: DISCONTINUED | OUTPATIENT
Start: 2018-10-23 | End: 2018-10-24

## 2018-10-23 RX ORDER — HYDROXYCHLOROQUINE SULFATE 200 MG
200 TABLET ORAL DAILY
Qty: 0 | Refills: 0 | Status: DISCONTINUED | OUTPATIENT
Start: 2018-10-24 | End: 2018-10-24

## 2018-10-23 RX ORDER — HYDROXYCHLOROQUINE SULFATE 200 MG
TABLET ORAL
Qty: 0 | Refills: 0 | Status: DISCONTINUED | OUTPATIENT
Start: 2018-10-23 | End: 2018-10-24

## 2018-10-23 RX ORDER — HYDROXYCHLOROQUINE SULFATE 200 MG
200 TABLET ORAL ONCE
Qty: 0 | Refills: 0 | Status: COMPLETED | OUTPATIENT
Start: 2018-10-23 | End: 2018-10-23

## 2018-10-23 RX ORDER — BELIMUMAB 200 MG/ML
0 SOLUTION SUBCUTANEOUS
Qty: 0 | Refills: 0 | COMMUNITY

## 2018-10-23 RX ADMIN — Medication 500 MILLIGRAM(S): at 14:32

## 2018-10-23 RX ADMIN — Medication 500 MILLIGRAM(S): at 21:43

## 2018-10-23 RX ADMIN — Medication 200 MILLIGRAM(S): at 14:32

## 2018-10-23 RX ADMIN — Medication 30 MILLIGRAM(S): at 17:21

## 2018-10-23 RX ADMIN — ENOXAPARIN SODIUM 40 MILLIGRAM(S): 100 INJECTION SUBCUTANEOUS at 07:02

## 2018-10-23 RX ADMIN — CEFTRIAXONE 100 GRAM(S): 500 INJECTION, POWDER, FOR SOLUTION INTRAMUSCULAR; INTRAVENOUS at 00:08

## 2018-10-23 RX ADMIN — Medication 10 MILLIGRAM(S): at 07:02

## 2018-10-23 RX ADMIN — Medication 500 MILLIGRAM(S): at 22:40

## 2018-10-23 RX ADMIN — Medication 500 MILLIGRAM(S): at 15:32

## 2018-10-23 NOTE — DISCHARGE NOTE ADULT - PATIENT PORTAL LINK FT
You can access the [facility], offered by [facility], by registering with the following website: [email or web address]

## 2018-10-23 NOTE — PROGRESS NOTE ADULT - PROBLEM SELECTOR PLAN 2
Microcytic anemia, low iron, consistent with JOSE Microcytic anemia, low iron, consistent with JOSE  - Pt reports constipation with iron previously, no intervention at this time

## 2018-10-23 NOTE — PROGRESS NOTE ADULT - SUBJECTIVE AND OBJECTIVE BOX
Consuelo Kvng PGY1  Pager #[phone]    Patient is a 22y old  Female who presents with a chief complaint of infection vs lupus flair (23 Oct 2018 04:45)      INTERVAL HPI/OVERNIGHT EVENTS: pt admitted overnight. Currently reports 7/10 muscle pain everywhere, from 10/10 at max. Comes and goes, no associated symptoms, has been using meloxicam and tylenol at home with little benefit. Similar to previous SLE flares. Currently no nausea, subjective fever, chills. States she has been told she is anemic in the past, has taken iron the past, not taking currently.        REVIEW OF SYSTEMS:  CONSTITUTIONAL: No fever  EYES: No eye pain, visual disturbances  ENMT:  No sinus or throat pain  RESPIRATORY: No cough, wheezing, chills or hemoptysis; No shortness of breath  CARDIOVASCULAR: No chest pain, palpitations, dizziness, or leg swelling  GASTROINTESTINAL: No abdominal or epigastric pain. No nausea, vomiting, or hematemesis; No diarrhea or constipation. No melena or hematochezia.  GENITOURINARY: No dysuria, frequency  NEUROLOGICAL: No headaches  SKIN: No itching, burning, rashes, or lesions   MUSCULOSKELETAL: see HPI    T(C): 36.8 (10-23-18 @ 06:50), Max: 38.6 (10-22-18 @ 19:04)  HR: 72 (10-23-18 @ 06:50) (72 - 112)  BP: 102/62 (10-23-18 @ 06:50) (90/48 - 102/62)  RR: 18 (10-23-18 @ 06:50) (16 - 20)  SpO2: 100% (10-23-18 @ 06:50) (98% - 100%)    PHYSICAL EXAM:  GENERAL: NAD, well-groomed, well-developed  HEAD:  Atraumatic, Normocephalic  EYES: EOMI, PERRLA, conjunctiva and sclera clear  ENMT: Moist mucous membranes  NECK: Supple  NERVOUS SYSTEM:  Alert & Oriented X3  CHEST/LUNG: Clear to percussion bilaterally; No rales, rhonchi, wheezing, or rubs  HEART: Regular rate and rhythm; No murmurs, rubs, or gallops  ABDOMEN: Soft, Nontender, Nondistended; Bowel sounds present  EXTREMITIES:  2+ Peripheral Pulses, No clubbing, cyanosis, or edema  SKIN: No rashes or lesions    Consultant(s) Notes Reviewed:  [x ] YES  [ ] NO  Care Discussed with Consultants/Other Providers [ x] YES  [ ] NO    LABS:                        9.2    3.06  )-----------( 470      ( 23 Oct 2018 06:30 )             31.2     10-23    138  |  106  |  8   ----------------------------<  83  3.9   |  23  |  0.58    Ca    7.9<L>      23 Oct 2018 06:30    TPro  6.1  /  Alb  2.5<L>  /  TBili  < 0.2<L>  /  DBili  x   /  AST  13  /  ALT  4   /  AlkPhos  39<L>  10-23    Urinalysis Basic - ( 22 Oct 2018 18:35 )    Color: YELLOW / Appearance: TURBID / S.034 / pH: 6.5  Gluc: NEGATIVE / Ketone: NEGATIVE  / Bili: NEGATIVE / Urobili: TRACE   Blood: NEGATIVE / Protein: 100 / Nitrite: NEGATIVE   Leuk Esterase: NEGATIVE / RBC: 0-2 / WBC 11-25   Sq Epi: MANY / Non Sq Epi: x / Bacteria: FEW    Urinalysis Basic - ( 22 Oct 2018 18:35 )    Color: YELLOW / Appearance: TURBID / S.034 / pH: 6.5  Gluc: NEGATIVE / Ketone: NEGATIVE  / Bili: NEGATIVE / Urobili: TRACE   Blood: NEGATIVE / Protein: 100 / Nitrite: NEGATIVE   Leuk Esterase: NEGATIVE / RBC: 0-2 / WBC 11-25   Sq Epi: MANY / Non Sq Epi: x / Bacteria: FEW    RADIOLOGY & ADDITIONAL TESTS:    Imaging Personally Reviewed:  [x] YES  [ ] NO    CTAP IMPRESSION:     No bowel obstruction or evidence of bowel inflammation. Appendix not   discretely identified, however, no secondary CT findings to suggest   appendicitis.    Left ovarian cyst measuring 2.1 cm. No pelvic free fluid.    Small bilateral pleural effusions with bibasilar atelectasis.    Trace pericardial effusion.    CXR INTERPRETATION:  Bilateral pleural effusions, right greater than left.

## 2018-10-23 NOTE — H&P ADULT - ASSESSMENT
21 y/o female w/ pmhx of SLE on prednisone and belimumab presenting for 1wk of fever, and joint pain with no clear sign of infectious source, may be underlying SLE flair.

## 2018-10-23 NOTE — H&P ADULT - HISTORY OF PRESENT ILLNESS
21 y/o female w/ pmhx of SLE on prednisone and belimumab presenting for 1wk of fever, and joint pain. She states that she started having subjective fevers and intermittent chills 1 week ago and then subsequently started having LLQ abdominal pain with mild nausea without vomiting and no diarrhea, abdominal distention or chest pain. She also had diffuse joint pain that did not improve with aspirin. She otherwise has not complaints, last menstrual period 1 week ago.  Patient usually goes to [facility] for treatment and is on chronic prednisone 10mg qd. She also started belimumab in March, after her most recent flair which has improved her symptom significantly. Her flairs improved from every month to 6months without. She states this current episode feels like previous SLE flairs and that she has known complications of pleural effusions.   In ED:    hx lupus on Prednisone and biologic infusions monthly here c/o joint pain x 1 week. 23 y/o female w/ pmhx of SLE on prednisone and belimumab presenting for 1wk of fever, and joint pain. She states that she started having subjective fevers and intermittent chills 1 week ago and then subsequently started having LLQ abdominal pain with mild nausea without vomiting and no diarrhea, abdominal distention or chest pain. She also had diffuse joint pain that did not improve with aspirin. She otherwise has not complaints, last menstrual period 1 week ago.  Patient usually goes to [facility] for treatment and is on chronic prednisone 10mg qd. She also started belimumab in March, after her most recent flair which has improved her symptom significantly. Her flairs improved from every month to 6months without. She states this current episode feels like previous SLE flairs and that she has known complications of pleural effusions.   In ED: vitals: .5/ 112/ 98/62/ 98% received acetaminophen, cftx, fentanyl, diphenhydramine, 2L NS. 21 y/o female w/ pmhx of SLE on prednisone and belimumab presenting for 1wk of fevers and joint pain. She states that she started having subjective fevers and intermittent chills 1 week ago and then subsequently started having LLQ abdominal pain with mild nausea without vomiting and no diarrhea, abdominal distention or chest pain. She also had diffuse joint pain that did not improve with aspirin. She otherwise has not complaints, last menstrual period 1 week ago.    Patient usually goes to [facility] for treatment and is on chronic prednisone 10mg qd. She also started belimumab in March, after her most recent flair which has improved her symptom significantly. Her flairs improved from every month to 6months without. She states this current episode feels like previous SLE flairs and that she has known complications of pleural effusions. Denies any renal involvement from her lupus. Denies any chest pain currently.     In ED: vitals: T 99-.9>101.5/P 112/BP 98/62/R 20/O2 sat 98%. Her lab work was significant for anemia, elevated ESR/CRP and proteinuria. She received acetaminophen 975 PO x1, cftx 1g IVPB, fentanyl 25mcg IV x1, diphenhydramine 25mg PO, toradol 15mg IV, and 2L NS.

## 2018-10-23 NOTE — H&P ADULT - FAMILY HISTORY
Mother  Still living? Unknown  Family history of hypertension, Age at diagnosis: Age Unknown     Aunt  Still living? Unknown  Family history of systemic lupus erythematosus, Age at diagnosis: Age Unknown

## 2018-10-23 NOTE — H&P ADULT - NSHPLABSRESULTS_GEN_ALL_CORE
LABS:                        9.1    7.08  )-----------( 551      ( 22 Oct 2018 18:45 )             30.1     10    136  |  100  |  15  ----------------------------<  106<H>  3.8   |  24  |  0.72    Ca    8.4      22 Oct 2018 18:45    TPro  7.1  /  Alb  3.2<L>  /  TBili  < 0.2<L>  /  DBili  x   /  AST  17  /  ALT  4   /  AlkPhos  40  10-22          Urinalysis Basic - ( 22 Oct 2018 18:35 )    Color: YELLOW / Appearance: TURBID / S.034 / pH: 6.5  Gluc: NEGATIVE / Ketone: NEGATIVE  / Bili: NEGATIVE / Urobili: TRACE   Blood: NEGATIVE / Protein: 100 / Nitrite: NEGATIVE   Leuk Esterase: NEGATIVE / RBC: 0-2 / WBC 11-25   Sq Epi: MANY / Non Sq Epi: x / Bacteria: FEW    < from: CT Abdomen and Pelvis w/ Oral Cont and w/ IV Cont (10.22.18 @ 21:41) >      IMPRESSION:     No bowel obstruction or evidence of bowel inflammation. Appendix not   discretely identified, however, no secondary CT findings to suggest   appendicitis.    Left ovarian cyst measuring 2.1 cm. No pelvic free fluid.    Small bilateral pleural effusions with bibasilar atelectasis.    Trace pericardial effusion.        < end of copied text > LABS:                        9.1    7.08  )-----------( 551      ( 22 Oct 2018 18:45 )             30.1     10    136  |  100  |  15  ----------------------------<  106<H>  3.8   |  24  |  0.72    Ca    8.4      22 Oct 2018 18:45    TPro  7.1  /  Alb  3.2<L>  /  TBili  < 0.2<L>  /  DBili  x   /  AST  17  /  ALT  4   /  AlkPhos  40  10-22    Urinalysis Basic - ( 22 Oct 2018 18:35 )  Color: YELLOW / Appearance: TURBID / S.034 / pH: 6.5  Gluc: NEGATIVE / Ketone: NEGATIVE  / Bili: NEGATIVE / Urobili: TRACE   Blood: NEGATIVE / Protein: 100 / Nitrite: NEGATIVE   Leuk Esterase: NEGATIVE / RBC: 0-2 / WBC 11-25   Sq Epi: MANY / Non Sq Epi: x / Bacteria: FEW    < from: CT Abdomen and Pelvis w/ Oral Cont and w/ IV Cont (10.22.18 @ 21:41) >  IMPRESSION:     No bowel obstruction or evidence of bowel inflammation. Appendix not   discretely identified, however, no secondary CT findings to suggest   appendicitis.    Left ovarian cyst measuring 2.1 cm. No pelvic free fluid.    Small bilateral pleural effusions with bibasilar atelectasis.    Trace pericardial effusion.  < end of copied text >

## 2018-10-23 NOTE — DISCHARGE NOTE ADULT - CARE PROVIDER_API CALL
Juan C Joaquin  [address] [place] [address], [address] [place] [address]  Phone: [phone]  Fax: (   )    -

## 2018-10-23 NOTE — DISCHARGE NOTE ADULT - CONDITIONS AT DISCHARGE
She is stable , she has no complaints of pain at this time and was able to rest well into the Morning.  Vital signs are stable and Instructions for going Home are given

## 2018-10-23 NOTE — DISCHARGE NOTE ADULT - PROVIDER TOKENS
FREE:[LAST:[Jemal],FIRST:[Juan C],PHONE:[phone]],FAX:[(   [phone]    -],ADDRESS:[[address] [place] [address], [address] [place] [address]]]

## 2018-10-23 NOTE — H&P ADULT - PROBLEM SELECTOR PLAN 1
Patient with no clear infectious source, with fevers and arthralgias on chronic prednisone and belimumab. Abdominal pain may be SLE involvement. CT ab/pelvis with no acute pathology concerning for infection.  -monitor off of antibx, f/u bcx. If worsening or not improving consider antibx coverage.   -mildly elevate lactate will repeat vbg for resolution s/p resucitation  -consult rheum for assistance  -continue prednisone 10mg qd Patient with no clear infectious source, with fevers and arthralgias on chronic prednisone and belimumab. Abdominal pain may be SLE involvement. CT ab/pelvis with no acute pathology concerning for infection.  -monitor off of antibx, f/u bcx. If worsening or not improving consider antibx coverage.   -mildly elevate lactate will repeat vbg for resolution s/p resucitation  -consult rheum for assistance  -continue prednisone 10mg qd  -check C3, C4 and CH50 for evaluation of flair Patient with no clear infectious source, with fevers and arthralgias on chronic prednisone and belimumab. Abdominal pain may be SLE involvement. CT ab/pelvis with no acute pathology concerning for infection.  -monitor off of antibx, f/u bcx. If worsening or not improving consider antibx coverage.   -mildly elevate lactate will repeat vbg for resolution s/p IVF  -consult rheum for assistance  -continue prednisone 10mg qd  -check dsDNA, C3, C4 and CH50 for evaluation of flair

## 2018-10-23 NOTE — H&P ADULT - ATTENDING COMMENTS
Patient seen and examined on 10/23/18, case discussed with Dr. Paez, agree with assessment and plan as above.

## 2018-10-23 NOTE — PROGRESS NOTE ADULT - PROBLEM SELECTOR PLAN 1
Currently afebrile, WBC wnl, CTAP no acute pathology. ESR, CRP elevated, C3, C4 depressed, consistent with SLE flare.   - Rheum consult  - Continue prednisone 10mg Currently afebrile, WBC wnl, CTAP no acute pathology. ESR, CRP elevated, C3, C4 depressed, consistent with SLE flare.   - Rheum consult  - Continue prednisone 10mg  - Restart hydrochloroquine 200mg  - F/u dsDNA

## 2018-10-23 NOTE — DISCHARGE NOTE ADULT - CARE PLAN
Principal Discharge DX:	Lupus erythematosus, unspecified form  Goal:	resolution of flare  Assessment and plan of treatment:	You were admitted for uncontrolled pain due to your lupus flare. Your prednisone was increased to 40mg daily. You were given naproxen 500mg twice daily for pain, with ketorolac 10mg as needed for severe pain. You were also restarted on your hydroxychloroquine. Continue taking these medications until your follow-up appointment with Dr. Joaquin on  Thursday.  Secondary Diagnosis:	Microcytic anemia Principal Discharge DX:	Lupus erythematosus, unspecified form  Goal:	resolution of flare  Assessment and plan of treatment:	You were admitted for uncontrolled pain due to your lupus flare. Your prednisone was increased to 40mg daily. You were given naproxen 500mg twice daily for pain. You were also restarted on your hydroxychloroquine. Continue taking these medications until your follow-up appointment with Dr. Joaquin on  Thursday.  Secondary Diagnosis:	Microcytic anemia  Secondary Diagnosis:	Need for prophylactic measure  Goal:	side effect control  Assessment and plan of treatment:	You were started on protonix 40mg daily. This is medication can help prevent stomach ulcers and bleeding that can be caused by long-term prednisone use. Principal Discharge DX:	Lupus erythematosus, unspecified form  Goal:	resolution of flare  Assessment and plan of treatment:	You were admitted for uncontrolled pain due to your lupus flare. Your prednisone was increased to 40mg daily which you should take for 5 days and then decrease the dose to 20mg for 3 days and then 10mg daily. You were given naproxen 500mg twice daily for pain which you can continue taking at home with food for 3-5 days and then as needed. You were also restarted on your hydroxychloroquine. Continue taking these medications until your follow-up appointment with Dr. Joaquin on 10/25 Thursday.  Secondary Diagnosis:	Microcytic anemia  Assessment and plan of treatment:	You have low iron levels in your body. Please take iron supplements. You may take stool softners to decrease constipation  Secondary Diagnosis:	Need for prophylactic measure  Goal:	side effect control  Assessment and plan of treatment:	You were started on protonix 40mg daily. This is medication can help prevent stomach ulcers and bleeding that can be caused by long-term prednisone use. Principal Discharge DX:	Lupus erythematosus, unspecified form  Goal:	resolution of flare  Assessment and plan of treatment:	You were admitted for uncontrolled pain due to your lupus flare. Your dsDNA was >1000 indicating high disease activity. Your prednisone was increased to 40mg daily which you should take for 5 days and then decrease the dose to 20mg for 3 days and then 10mg daily. You were given naproxen 500mg twice daily for pain which you can continue taking at home with food for 3-5 days and then as needed. You were also restarted on your hydroxychloroquine. Continue taking these medications until your follow-up appointment with Dr. Joaquin on 10/25 Thursday.  Secondary Diagnosis:	Microcytic anemia  Assessment and plan of treatment:	You have low iron levels in your body. Please take iron supplements. You may take stool softeners to decrease constipation  Secondary Diagnosis:	Need for prophylactic measure  Goal:	side effect control  Assessment and plan of treatment:	You were started on protonix 40mg daily. This is medication can help prevent stomach ulcers and bleeding that can be caused by long-term prednisone use.

## 2018-10-23 NOTE — H&P ADULT - PROBLEM SELECTOR PLAN 3
DVT: enoxaparin  Diet: regular  Dispo: symptom improvement. Patient with microcytic anemia, LMP 1 week ago, potentially from loss and chronic disease.  -iron studies

## 2018-10-23 NOTE — PROGRESS NOTE ADULT - ASSESSMENT
22F w/SLE on prednisone and belimumab q28 days presenting for 1wk of subjective fever and joint pain, no evidence of infectious source, may be underlying SLE flair.

## 2018-10-23 NOTE — DISCHARGE NOTE ADULT - MEDICATION SUMMARY - MEDICATIONS TO TAKE
I will START or STAY ON the medications listed below when I get home from the hospital:    predniSONE 20 mg oral tablet  -- 2 tab(s) by mouth once a day  -- Indication: For Lupus erythematosus, unspecified form    hydroxychloroquine 200 mg oral tablet  -- 1 tab(s) by mouth once a day  -- Indication: For Lupus erythematosus, unspecified form    belimumab  -- Indication: For Lupus erythematosus, unspecified form    pantoprazole 40 mg oral delayed release tablet  -- 1 tab(s) by mouth once a day (before a meal)  -- Indication: For Need for prophylactic measure

## 2018-10-23 NOTE — DISCHARGE NOTE ADULT - HOSPITAL COURSE
HPI:  21 y/o female w/ pmhx of SLE on prednisone and belimumab presenting for 1wk of fevers and joint pain. She states that she started having subjective fevers and intermittent chills 1 week ago and then subsequently started having LLQ abdominal pain with mild nausea without vomiting and no diarrhea, abdominal distention or chest pain. She also had diffuse joint pain that did not improve with aspirin. She otherwise has not complaints, last menstrual period 1 week ago.    Patient usually goes to [facility] for treatment and is on chronic prednisone 10mg qd. She also started belimumab in March, after her most recent flair which has improved her symptom significantly. Her flairs improved from every month to 6months without. She states this current episode feels like previous SLE flairs and that she has known complications of pleural effusions. Denies any renal involvement from her lupus. Denies any chest pain currently.     In ED: vitals: T 99-.9>101.5/P 112/BP 98/62/R 20/O2 sat 98%. Her lab work was significant for anemia, elevated ESR/CRP and proteinuria. She received acetaminophen 975 PO x1, cftx 1g IVPB, fentanyl 25mcg IV x1, diphenhydramine 25mg PO, toradol 15mg IV, and 2L NS.     Hospital course:  Pt was found to have elevated ESR, CRP, depressed C3, C4, and proteinuria on UA. Pt additionally found to have small bilateral pleural effusions with no respiratory symptoms. Pt was afebrile with normal white count and not acute pathology on CTAP. All evidence consistent with SLE flare. Pt was started on naproxen 500mg BID and ketorolac 10mg q4hrs PRN for pain control and restarted on hydrochloroquine (home med that had not been taken since September). Prednisone additionally was increased to 40mg. Pain improved and pt was discharged, will follow-up with her rheumatologist Dr. Juan C Joaquin at [facility] Thursday 10/25.    Pt found to have microcytic anemia with decreased iron and ferritin. Pt aware and reported previously taking iron for JOSE but experienced constipation. HPI:  23 y/o female w/ pmhx of SLE on prednisone and belimumab presenting for 1wk of fevers and joint pain. She states that she started having subjective fevers and intermittent chills 1 week ago and then subsequently started having LLQ abdominal pain with mild nausea without vomiting and no diarrhea, abdominal distention or chest pain. She also had diffuse joint pain that did not improve with aspirin. She otherwise has not complaints, last menstrual period 1 week ago.    Patient usually goes to [facility] for treatment and is on chronic prednisone 10mg qd. She also started belimumab in March, after her most recent flair which has improved her symptom significantly. Her flairs improved from every month to 6months without. She states this current episode feels like previous SLE flairs and that she has known complications of pleural effusions. Denies any renal involvement from her lupus. Denies any chest pain currently.     In ED: vitals: T 99-.9>101.5/P 112/BP 98/62/R 20/O2 sat 98%. Her lab work was significant for anemia, elevated ESR/CRP and proteinuria. She received acetaminophen 975 PO x1, cftx 1g IVPB, fentanyl 25mcg IV x1, diphenhydramine 25mg PO, toradol 15mg IV, and 2L NS.     Hospital course:  Pt was found to have elevated ESR, CRP, depressed C3, C4, and proteinuria on UA. Pt additionally found to have small bilateral pleural effusions with no respiratory symptoms. Pt was afebrile with normal white count and not acute pathology on CTAP. All evidence consistent with SLE flare. Pt was started on naproxen 500mg BID and ketorolac 10mg q4hrs PRN for pain control and restarted on hydrochloroquine (home med that had not been taken since September). Prednisone additionally was increased to 40mg. Pain improved with naproxen. Patient is hemodynamically stable for discharge and will follow-up with her rheumatologist Dr. Juan C Joaquin at [facility] Thursday 10/25. HPI:  23 y/o female w/ pmhx of SLE on prednisone and belimumab presenting for 1wk of fevers and joint pain. She states that she started having subjective fevers and intermittent chills 1 week ago and then subsequently started having LLQ abdominal pain with mild nausea without vomiting and no diarrhea, abdominal distention or chest pain. She also had diffuse joint pain that did not improve with aspirin. She otherwise has not complaints, last menstrual period 1 week ago.    Patient usually goes to [facility] for treatment and is on chronic prednisone 10mg qd. She also started belimumab in March, after her most recent flair which has improved her symptom significantly. Her flairs improved from every month to 6months without. She states this current episode feels like previous SLE flairs and that she has known complications of pleural effusions. Denies any renal involvement from her lupus. Denies any chest pain currently.     In ED: vitals: T 99-.9>101.5/P 112/BP 98/62/R 20/O2 sat 98%. Her lab work was significant for anemia, elevated ESR/CRP and proteinuria. She received acetaminophen 975 PO x1, cftx 1g IVPB, fentanyl 25mcg IV x1, diphenhydramine 25mg PO, toradol 15mg IV, and 2L NS.     Hospital course:  Pt was found to have elevated ESR, CRP, depressed C3, C4, dsDNA >1000, and proteinuria on UA. Pt additionally found to have small bilateral pleural effusions with no respiratory symptoms. Pt was afebrile with normal white count and not acute pathology on CTAP. All evidence consistent with SLE flare. Pt was started on naproxen 500mg BID and ketorolac 10mg q4hrs PRN for pain control and restarted on hydrochloroquine (home med that had not been taken since September). Prednisone additionally was increased to 40mg. Pain improved with naproxen. Patient is hemodynamically stable for discharge and will follow-up with her rheumatologist Dr. Juan C Joaquin at [facility] Thursday 10/25.

## 2018-10-23 NOTE — CONSULT NOTE ADULT - SUBJECTIVE AND OBJECTIVE BOX
HISTORY OF PRESENT ILLNESS:    Patient is a 22y Female    HPI:  21 y/o female w/ pmhx of SLE on prednisone and belimumab presenting for 1wk of fevers as well as muscle and joint pains. She states that she started having subjective fevers and intermittent chills 1 week ago and then subsequently started having LLQ abdominal pain with mild nausea without vomiting and no diarrhea, abdominal distention or chest pain. She also had diffuse muscle and joint pain that did not improve with aspirin. She otherwise has not complaints, last menstrual period 1 week ago.  Pt was started on prednisone 40mg daily, and now reports her symptoms have virtually resolved.    Patient usually goes to [facility] for treatment and is on chronic prednisone 10mg qd. She also started belimumab in March, after her most recent flare which has improved her symptom significantly. Her flares improved from every month to 6months without. She states this current episode feels like previous SLE flares and that she has known complications of pleural effusions. Denies any renal involvement from her lupus. Denies any chest pain currently.     In ED: vitals: T 99-.9>101.5/P 112/BP 98/62/R 20/O2 sat 98%. Her lab work was significant for anemia, elevated ESR/CRP and proteinuria. She received acetaminophen 975 PO x1, cftx 1g IVPB, fentanyl 25mcg IV x1, diphenhydramine 25mg PO, toradol 15mg IV, and 2L NS. (23 Oct 2018 04:45)    PAST MEDICAL & SURGICAL HISTORY:  Lupus erythematosus, unspecified form  No significant past surgical history      ROS: No chest pain/dyspnea/cough, oral ulcers, rashes, alopecia, photosensitivity, dry eye/dry mouth, Raynaud's, dysphagia, focal weakness, or eye symptoms,.    MEDICATIONS  (STANDING):  hydroxychloroquine      influenza   Vaccine 0.5 milliLiter(s) IntraMuscular once  naproxen 500 milliGRAM(s) Oral two times a day  naproxen      pantoprazole    Tablet 40 milliGRAM(s) Oral before breakfast  predniSONE   Tablet 40 milliGRAM(s) Oral daily    MEDICATIONS  (PRN):  ketorolac 10 milliGRAM(s) Oral every 4 hours PRN Severe Pain (7 - 10)      Allergies    No Known Allergies    Intolerances        FAMILY HISTORY:  Family history of systemic lupus erythematosus (Aunt)  Family history of hypertension (Mother)      SOCIAL HISTORY:  Tobacco--   none            Vital Signs Last 24 Hrs  T(C): 36.3 (23 Oct 2018 14:46), Max: 36.9 (23 Oct 2018 01:45)  T(F): 97.4 (23 Oct 2018 14:46), Max: 98.4 (23 Oct 2018 01:45)  HR: 95 (23 Oct 2018 14:46) (72 - 95)  BP: 103/69 (23 Oct 2018 14:46) (91/60 - 103/69)  BP(mean): --  RR: 18 (23 Oct 2018 14:46) (16 - 18)  SpO2: 100% (23 Oct 2018 14:46) (99% - 100%)    PHYSICAL EXAM:  General :  NAD  HEENT--  no oral ulcers  Nodes--   LAD  Lungs--  CTA B/L  Heart--  RRR, nlS1 &S2 normal;   Abdomen--  soft, NT, ND +BS  Skin:  no rashes  Musculoskeletal exam:  no synovitis, full ROM in all joints    LABS:                        9.2    3.06  )-----------( 470      ( 23 Oct 2018 06:30 )             31.2     10-23    138  |  106  |  8   ----------------------------<  83  3.9   |  23  |  0.58    Ca    7.9<L>      23 Oct 2018 06:30    TPro  6.1  /  Alb  2.5<L>  /  TBili  < 0.2<L>  /  DBili  x   /  AST  13  /  ALT  4   /  AlkPhos  39<L>  10-23      Urinalysis Basic - ( 22 Oct 2018 18:35 )    Color: YELLOW / Appearance: TURBID / S.034 / pH: 6.5  Gluc: NEGATIVE / Ketone: NEGATIVE  / Bili: NEGATIVE / Urobili: TRACE   Blood: NEGATIVE / Protein: 100 / Nitrite: NEGATIVE   Leuk Esterase: NEGATIVE / RBC: 0-2 / WBC 11-25   Sq Epi: MANY / Non Sq Epi: x / Bacteria: FEW    C3 Complement, Serum (10.23.18 @ 06:30)    C3 Complement, Serum: 43.1 mg/dL    C4 Complement, Serum (10.23.18 @ 06:30)    C4 Complement, Serum: 9.1 mg/dL        RADIOLOGY & ADDITIONAL STUDIES:    < from: Xray Chest 2 Views PA/Lat (10.22.18 @ 20:36) >  EXAM:  XR CHEST PA LAT 2V        PROCEDURE DATE:  Oct 22 2018         INTERPRETATION:  CLINICAL INFORMATION: Fever.    TECHNIQUE: PA and lateral views of the chest 10/22/2018.    COMPARISON: Chest radiograph 10/27/2017.     FINDINGS:     There are bilateral pleural effusions, right greater than left. There is   no pneumothorax.  Cardiac size appears enlarged.  The visualized osseous structures demonstrate no acute abnormality.    IMPRESSION:   Bilateral pleural effusions, right greater than left.    < end of copied text >

## 2018-10-23 NOTE — DISCHARGE NOTE ADULT - ADDITIONAL INSTRUCTIONS
Please follow up with your Rheumatologist Dr Joaquin on 10/25 Please follow up with your Rheumatologist Dr Joaquin on 10/25 and bring your hospital discharge paperwork.

## 2018-10-23 NOTE — CONSULT NOTE ADULT - ASSESSMENT
22 year old female with SLE admitted with fevers and diffuse pain (muscles and joints), most likely due to SLE flare.  Symptoms now virtually resolved on increased dose of steroids.    - Cont prednisone 40mg daily x 1 week, then begin to taper.    - Cont Plaquenil 400mg/day.      - Pt should f/u with her outpt rheumatologist within 1 week after D/C.

## 2018-10-23 NOTE — DISCHARGE NOTE ADULT - PLAN OF CARE
resolution of flare You were admitted for uncontrolled pain due to your lupus flare. Your prednisone was increased to 40mg daily. You were given naproxen 500mg twice daily for pain, with ketorolac 10mg as needed for severe pain. You were also restarted on your hydroxychloroquine. Continue taking these medications until your follow-up appointment with Dr. Joaquin on  Thursday. You were admitted for uncontrolled pain due to your lupus flare. Your prednisone was increased to 40mg daily. You were given naproxen 500mg twice daily for pain. You were also restarted on your hydroxychloroquine. Continue taking these medications until your follow-up appointment with Dr. Joaquin on  Thursday. side effect control You were started on protonix 40mg daily. This is medication can help prevent stomach ulcers and bleeding that can be caused by long-term prednisone use. You were admitted for uncontrolled pain due to your lupus flare. Your prednisone was increased to 40mg daily which you should take for 5 days and then decrease the dose to 20mg for 3 days and then 10mg daily. You were given naproxen 500mg twice daily for pain which you can continue taking at home with food for 3-5 days and then as needed. You were also restarted on your hydroxychloroquine. Continue taking these medications until your follow-up appointment with Dr. Joaquin on 10/25 Thursday. You have low iron levels in your body. Please take iron supplements. You may take stool softners to decrease constipation You were admitted for uncontrolled pain due to your lupus flare. Your dsDNA was >1000 indicating high disease activity. Your prednisone was increased to 40mg daily which you should take for 5 days and then decrease the dose to 20mg for 3 days and then 10mg daily. You were given naproxen 500mg twice daily for pain which you can continue taking at home with food for 3-5 days and then as needed. You were also restarted on your hydroxychloroquine. Continue taking these medications until your follow-up appointment with Dr. Joaquin on 10/25 Thursday. You have low iron levels in your body. Please take iron supplements. You may take stool softeners to decrease constipation

## 2018-10-23 NOTE — H&P ADULT - PROBLEM SELECTOR PLAN 2
DVT: enoxaparin  Diet: regular  Dispo: symptom improvement. Patient with microcytic anemia, LMP 1 week ago, potentially from loss and chronic disease.  -iron studies - Noted to have some protein in her urine, denies having any renal involvement with her lupus  - Possibly related to her likely flare vs chronic proteinuria from lupus, would need follow up with her outpatient rheumatologist for further management

## 2018-10-23 NOTE — H&P ADULT - NSHPREVIEWOFSYSTEMS_GEN_ALL_CORE
REVIEW OF SYSTEMS:  Constitutional: [ ] negative [X ] fevers [ ] chills [ ] weight loss [ ] weight gain  HEENT: [X ] negative [ ] dry eyes [ ] eye irritation [ ] postnasal drip [ ] nasal congestion  CV: [X ] negative  [ ] chest pain [ ] orthopnea [ ] palpitations [ ] murmur  Resp: [X ] negative [ ] cough [ ] shortness of breath [ ] dyspnea [ ] wheezing [ ] sputum [ ] hemoptysis  GI: [ ] negative [ ] nausea [ ] vomiting [ ] diarrhea [ ] constipation [X ] abd pain [ ] dysphagia   : [X ] negative [ ] dysuria [ ] nocturia [ ] hematuria [ ] increased urinary frequency  Musculoskeletal: [ ] negative [ ] back pain [ X] myalgias [ X] arthralgias [ ] fracture  Skin: [ ] negative [X ] rash [ ] itch  Neurological: [ X] negative [ ] headache [ ] dizziness [ ] syncope [ ] weakness [ ] numbness  Psychiatric: [ X] negative [ ] anxiety [ ] depression  Endocrine: [ X] negative [ ] diabetes [ ] thyroid problem  Hematologic/Lymphatic: [ X] negative [ ] anemia [ ] bleeding problem  Allergic/Immunologic: [ X] negative [ ] itchy eyes [ ] nasal discharge [ ] hives [ ] angioedema  [ ] All other systems negative  [ ] Unable to assess ROS because ________ REVIEW OF SYSTEMS:    CONSTITUTIONAL: +Fevers, no chills, no malaise/fatigue  EYES: No visual changes or eye discharge  ENT: No rhinorrhea or sore throat  NECK: No pain or stiffness  RESPIRATORY: No cough, wheezing, hemoptysis; No shortness of breath  CARDIOVASCULAR: No chest pain or palpitations; No lower extremity edema  GASTROINTESTINAL: +Abd pain, generalized. +Nausea, no vomiting; No diarrhea or constipation. No melena or hematochezia.  MSK: Diffuse joint pains, Myalgias, no back pain  GENITOURINARY: No dysuria, frequency or hematuria  NEUROLOGICAL: No numbness or weakness  SKIN: No itching, burning, rashes, or lesions

## 2018-10-23 NOTE — H&P ADULT - NSHPPHYSICALEXAM_GEN_ALL_CORE
Vital Signs Last 24 Hrs  T(C): 36.9 (23 Oct 2018 01:45), Max: 38.6 (22 Oct 2018 19:04)  T(F): 98.4 (23 Oct 2018 01:45), Max: 101.5 (22 Oct 2018 19:04)  HR: 75 (23 Oct 2018 01:45) (75 - 112)  BP: 98/62 (23 Oct 2018 01:45) (90/48 - 98/62)  BP(mean): --  RR: 17 (23 Oct 2018 01:45) (16 - 20)  SpO2: 100% (23 Oct 2018 01:45) (98% - 100%)  CAPILLARY BLOOD GLUCOSE        I&O's Summary      PHYSICAL EXAM:  GENERAL: NAD, well-developed  HEAD:  Atraumatic, Normocephalic  EYES: EOMI, PERRLA, conjunctiva and sclera clear  NECK: Supple, No JVD  CHEST/LUNG: Decreased breathsounds bilateral bases, Clear to auscultation bilaterally; No wheeze  HEART: Regular rate and rhythm; No murmurs, rubs, or gallops  ABDOMEN: mild TTP LLQ without rebound, Soft, Nondistended; Bowel sounds present  EXTREMITIES:  2+ Peripheral Pulses, No clubbing, cyanosis, or edema  PSYCH: AAOx3  NEUROLOGY: non-focal  SKIN: No rashes or lesions Vital Signs Last 24 Hrs  T(C): 36.9 (23 Oct 2018 01:45), Max: 38.6 (22 Oct 2018 19:04)  T(F): 98.4 (23 Oct 2018 01:45), Max: 101.5 (22 Oct 2018 19:04)  HR: 75 (23 Oct 2018 01:45) (75 - 112)  BP: 98/62 (23 Oct 2018 01:45) (90/48 - 98/62)  BP(mean): --  RR: 17 (23 Oct 2018 01:45) (16 - 20)  SpO2: 100% (23 Oct 2018 01:45) (98% - 100%)  CAPILLARY BLOOD GLUCOSE      PHYSICAL EXAM:  GENERAL: NAD, well-developed  EYES: EOMI, PERRLA, conjunctiva and sclera clear  NECK: Supple, No JVD  CHEST/LUNG: Decreased breath sounds bilateral bases, Clear to auscultation bilaterally; No wheeze  HEART: Regular rate and rhythm; No murmurs, rubs, or gallops  ABDOMEN: mild TTP LLQ without rebound, Soft, Nondistended; Bowel sounds present  EXTREMITIES:  2+ Peripheral Pulses, No clubbing, cyanosis, or edema  PSYCH: AAOx3, nl behavior, nl affect  NEUROLOGY: non-focal, WATT x4  SKIN: No rashes or lesions

## 2018-10-24 VITALS
DIASTOLIC BLOOD PRESSURE: 74 MMHG | HEART RATE: 81 BPM | OXYGEN SATURATION: 100 % | TEMPERATURE: 98 F | SYSTOLIC BLOOD PRESSURE: 106 MMHG | RESPIRATION RATE: 17 BRPM

## 2018-10-24 LAB
BACTERIA UR CULT: SIGNIFICANT CHANGE UP
DSDNA AB SER-ACNC: >1000 IU/ML — HIGH
SPECIMEN SOURCE: SIGNIFICANT CHANGE UP

## 2018-10-24 PROCEDURE — 99239 HOSP IP/OBS DSCHRG MGMT >30: CPT

## 2018-10-24 RX ORDER — PANTOPRAZOLE SODIUM 20 MG/1
1 TABLET, DELAYED RELEASE ORAL
Qty: 30 | Refills: 0 | OUTPATIENT
Start: 2018-10-24 | End: 2018-11-22

## 2018-10-24 RX ORDER — HYDROXYCHLOROQUINE SULFATE 200 MG
1 TABLET ORAL
Qty: 30 | Refills: 0 | OUTPATIENT
Start: 2018-10-24 | End: 2018-11-22

## 2018-10-24 RX ADMIN — Medication 40 MILLIGRAM(S): at 06:30

## 2018-10-24 RX ADMIN — Medication 200 MILLIGRAM(S): at 12:05

## 2018-10-24 RX ADMIN — PANTOPRAZOLE SODIUM 40 MILLIGRAM(S): 20 TABLET, DELAYED RELEASE ORAL at 06:30

## 2018-10-24 RX ADMIN — Medication 500 MILLIGRAM(S): at 06:30

## 2018-10-24 NOTE — PROGRESS NOTE ADULT - PROBLEM SELECTOR PLAN 1
Pain resolved  - Appreciate rheum recs, continue prednisone 40mg, day 2/7  - Continue hydrochloroquine 200mg  - F/u dsDNA  - F/u appointment with outpt rheum 10/25 Pain resolved, dsDNA >1000 indicating high disease activity  - Appreciate rheum recs, continue prednisone 40mg, day 2/7  - Continue hydroxychlorquine 200mg  - F/u appointment with outpt rheum 10/25

## 2018-10-24 NOTE — PROGRESS NOTE ADULT - ASSESSMENT
22F w/SLE on prednisone, belimumab q28 days, non-compliant with hydrochloroquine presenting with 1wk of subjective fever and joint pain consistent with SLE flare, now resolving.

## 2018-10-24 NOTE — PROGRESS NOTE ADULT - SUBJECTIVE AND OBJECTIVE BOX
Consuelo Calderon MD PGY1  Pager #[phone]    Patient is a 22y old  Female who presents with a chief complaint of infection vs lupus flair (23 Oct 2018 22:12)    INTERVAL HPI/OVERNIGHT EVENTS: no acute events overnight. Pt states she has not had pain since yesterday afternoon. Denies fever, headache, chest pain, difficulty breathing, nausea, swelling.    REVIEW OF SYSTEMS:  CONSTITUTIONAL: No fever  EYES: No eye pain, visual disturbances  ENMT:  No sinus or throat pain  NECK: No pain or stiffness  RESPIRATORY: No cough, wheezing; No shortness of breath  CARDIOVASCULAR: No chest pain, palpitations, dizziness, or leg swelling  GASTROINTESTINAL: No abdominal or epigastric pain. No nausea, vomiting, or hematemesis; No diarrhea or constipation. No melena or hematochezia.  GENITOURINARY: No dysuria, frequency, hematuria, or incontinence  NEUROLOGICAL: No headaches  SKIN: No itching, burning, rashes  MUSCULOSKELETAL: No joint pain or swelling; No muscle, back, or extremity pain    T(C): 36.8 (10-24-18 @ 06:20), Max: 36.8 (10-24-18 @ 06:20)  HR: 81 (10-24-18 @ 06:20) (81 - 95)  BP: 106/74 (10-24-18 @ 06:20) (101/66 - 106/74)  RR: 17 (10-24-18 @ 06:20) (17 - 18)  SpO2: 100% (10-24-18 @ 06:20) (100% - 100%)    PHYSICAL EXAM:  GENERAL: NAD, well-groomed, well-developed  HEAD:  Atraumatic, Normocephalic  EYES: EOMI, PERRLA, conjunctiva and sclera clear  ENMT: Moist mucous membranes, Good dentition, No lesions  NECK: Supple, No JVD  NERVOUS SYSTEM:  Alert & Oriented X3  CHEST/LUNG: Clear to percussion bilaterally; No rales, rhonchi, wheezing, or rubs  HEART: Regular rate and rhythm; No murmurs, rubs, or gallops  ABDOMEN: Soft, Nontender, Nondistended; Bowel sounds present  EXTREMITIES:  2+ Peripheral Pulses, No clubbing, cyanosis, or edema  SKIN: No rashes or lesions    Consultant(s) Notes Reviewed:  [x ] YES  [ ] NO  Care Discussed with Consultants/Other Providers [ x] YES  [ ] NO    LABS:                        9.2    3.06  )-----------( 470      ( 23 Oct 2018 06:30 )             31.2     10-23    138  |  106  |  8   ----------------------------<  83  3.9   |  23  |  0.58    Ca    7.9<L>      23 Oct 2018 06:30    TPro  6.1  /  Alb  2.5<L>  /  TBili  < 0.2<L>  /  DBili  x   /  AST  13  /  ALT  4   /  AlkPhos  39<L>  10-23      Urinalysis Basic - ( 22 Oct 2018 18:35 )    Color: YELLOW / Appearance: TURBID / S.034 / pH: 6.5  Gluc: NEGATIVE / Ketone: NEGATIVE  / Bili: NEGATIVE / Urobili: TRACE   Blood: NEGATIVE / Protein: 100 / Nitrite: NEGATIVE   Leuk Esterase: NEGATIVE / RBC: 0-2 / WBC 11-25   Sq Epi: MANY / Non Sq Epi: x / Bacteria: FEW    Urinalysis Basic - ( 22 Oct 2018 18:35 )    Color: YELLOW / Appearance: TURBID / S.034 / pH: 6.5  Gluc: NEGATIVE / Ketone: NEGATIVE  / Bili: NEGATIVE / Urobili: TRACE   Blood: NEGATIVE / Protein: 100 / Nitrite: NEGATIVE   Leuk Esterase: NEGATIVE / RBC: 0-2 / WBC 11-25   Sq Epi: MANY / Non Sq Epi: x / Bacteria: FEW    RADIOLOGY & ADDITIONAL TESTS:    No new imaging.

## 2018-10-24 NOTE — PROGRESS NOTE ADULT - PROBLEM SELECTOR PLAN 2
Microcytic anemia, low iron, consistent with JOSE  - No intervention at this time given pt hx of supplement side effects

## 2018-10-24 NOTE — PROGRESS NOTE ADULT - ATTENDING COMMENTS
Patient seen and examined. Pt symptoms are much improved. DsDNA Ab >1000, c/w active lupus flare    Ms. Lucas is a 23 yo woman with SLE admitted for lupus flare. Infectious w/u negative to date. Pt also with chronic microcytic anemia with iron studies c/w component of iron deficiency. Patient clinically improved on increased dose of oral steroids and NSAIDs.   10/22 blood cx NGTD.   - naproxen BID for pain control.  - continue prednisone 40mg daily x1 week.  - pantoprazole 40mg daily for ulcer ppx given chronic steroid use.  - recommend starting oral iron therapy which can be done as an outpatient .   - continue hydroxychloroquine 200mg daily.   Patient medically stable to be discharged home today. Patient has f/u appt with outpatient rheumatologist scheduled for tomorrow. Counseled pt on importance of taking medications as prescribed. Spent 36 min coordinating discharge plan, including contact outpatient rheumatologist, and counseling patient on her condition and recommended post-discharge care.
Patient seen and examined this morning. Patient complaining or diffuse joint soreness. Pt states fever and arthralgias are typical of her lupus flares. Typical she uses meloxicam for symptom control, but she reports symptoms lasted longer than usual and when meloxicam wore off symptoms were present. Pt admits to self-discontinuing prescribed Plaquenil 200mg since September without discussing it with her rheumatologist. Pt without any other localizing symptoms suggestive of underlying acute infection.    Ms. Lucas is a 21 yo woman with SLE admitted for lupus flare with r/o of underlying infection. Infectious w/u negative thus far. Pt also with chronic microcytic anemia with iron studies c/w component of iron deficiency  - F/u blood cx. Will not start abx at this time  - naproxen BID for pain control. Ketorolac IV PRN for severe pain  - increase to prednisone 40mg daily   - start pantoprazole 40mg daily since pt on chronic steroid therapy  - restart home regimen of hydroxychloroquine 200mg daily. Counseled pt on recommendation not to stop prescribed meds without discussing with her outpatient medical providers.  - recommend starting oral iron therapy which can be done as an outpatient

## 2018-10-26 LAB — TOTAL HEM COMP BLD-ACNC: 16 U/ML — LOW (ref 42–95)

## 2018-10-27 LAB
BACTERIA BLD CULT: SIGNIFICANT CHANGE UP
BACTERIA BLD CULT: SIGNIFICANT CHANGE UP

## 2024-02-16 NOTE — PATIENT PROFILE ADULT - VISION (WITH CORRECTIVE LENSES IF THE PATIENT USUALLY WEARS THEM):
45 YO M with PMH HTN who presented to ED with right facial swelling x 3 days. Pt reports he was started on Doxycycline 2 days ago with minimal relief and subsequently followed up with dermatology who attempted I&D without success. Pt presented to ED with increased swelling and redness. In ED, pt with mild leukocytosis 12.39, Cr 1.5 and CT scan significant for "There is diffuse thickening of the buccal soft tissues along the right side of the mandible with overlying cellulitis.  No discrete abscess or drainable fluid collection is visualized.  No large dental caries or periapical lucency is visualized.  Findings may be secondary to underlying gingival infection." Pt placed in CDU for antibiotics, ID and Dermatology consults.  While in CDU pt redness, pain, and swelling continues to improve. labs this morning with no leukocytosis. derm cleared pt for discharge and ID advised discharge with keflex and doxycyline with outpt derm f/u. Given location of cellulitis pt was offered another night in CDU for IV abx, but pt states he feel significantly better and on exam there is improvement. VSS, non-toxica appearing sitting upright in stretcher, speaking in full sentences with no accessory muscle use/tripoding or drooling. Pt tolerating PO intake and there is no submental swelling. pt discharged with STRICT return precautions and prompt f/u. Pt verbalized an understanding and agrees with the plan. IV removed. Normal vision: sees adequately in most situations; can see medication labels, newsprint